# Patient Record
Sex: FEMALE | Race: WHITE | NOT HISPANIC OR LATINO | URBAN - NONMETROPOLITAN AREA
[De-identification: names, ages, dates, MRNs, and addresses within clinical notes are randomized per-mention and may not be internally consistent; named-entity substitution may affect disease eponyms.]

---

## 2020-12-07 ENCOUNTER — IMPORTED ENCOUNTER (OUTPATIENT)
Dept: URBAN - NONMETROPOLITAN AREA CLINIC 1 | Facility: CLINIC | Age: 80
End: 2020-12-07

## 2020-12-07 PROBLEM — B02.9: Noted: 2020-12-07

## 2020-12-07 PROCEDURE — 99203 OFFICE O/P NEW LOW 30 MIN: CPT

## 2020-12-07 NOTE — PATIENT DISCUSSION
Herpes ZosterDiscussed diagnosis in detail with patient. Continue Valacycylvoir as prescribed by Urgent Care. Continue to monitor.

## 2020-12-11 ENCOUNTER — IMPORTED ENCOUNTER (OUTPATIENT)
Dept: URBAN - NONMETROPOLITAN AREA CLINIC 1 | Facility: CLINIC | Age: 80
End: 2020-12-11

## 2020-12-11 PROBLEM — H49.01: Noted: 2020-12-11

## 2020-12-11 PROCEDURE — 92012 INTRM OPH EXAM EST PATIENT: CPT

## 2020-12-11 NOTE — PATIENT DISCUSSION
3rd nerve palsy ODDiscussed findings in detail with patient. Signs/symptoms discussed with patient. (-) APD OD. Continue to monitor. RTC in 1 month for follow up

## 2021-01-15 ENCOUNTER — PREPPED CHART (OUTPATIENT)
Dept: URBAN - NONMETROPOLITAN AREA CLINIC 1 | Facility: CLINIC | Age: 81
End: 2021-01-15

## 2021-01-15 ENCOUNTER — IMPORTED ENCOUNTER (OUTPATIENT)
Dept: URBAN - NONMETROPOLITAN AREA CLINIC 1 | Facility: CLINIC | Age: 81
End: 2021-01-15

## 2021-01-15 PROCEDURE — 99213 OFFICE O/P EST LOW 20 MIN: CPT

## 2021-01-15 NOTE — PATIENT DISCUSSION
3rd nerve palsy OD - improvingDiscussed findings in detail with patient. Signs/symptoms discussed with patient. (-) APD OD. Continue to monitor. RTC 1 year or PRN

## 2021-01-15 NOTE — PATIENT DISCUSSION
improving, Discussed findings in detail with patient. Signs/Symptoms discussed. APD OD continue to monitor.

## 2022-02-27 ASSESSMENT — VISUAL ACUITY: OS_CC: 20/50-1

## 2022-02-27 ASSESSMENT — TONOMETRY
OS_IOP_MMHG: 15
OD_IOP_MMHG: 11

## 2022-02-28 ENCOUNTER — ESTABLISHED PATIENT (OUTPATIENT)
Dept: URBAN - NONMETROPOLITAN AREA CLINIC 1 | Facility: CLINIC | Age: 82
End: 2022-02-28

## 2022-02-28 DIAGNOSIS — Z96.1: ICD-10-CM

## 2022-02-28 DIAGNOSIS — H52.4: ICD-10-CM

## 2022-02-28 DIAGNOSIS — H35.3131: ICD-10-CM

## 2022-02-28 PROCEDURE — 92015 DETERMINE REFRACTIVE STATE: CPT

## 2022-02-28 PROCEDURE — 99214 OFFICE O/P EST MOD 30 MIN: CPT

## 2022-02-28 ASSESSMENT — VISUAL ACUITY
OD_PH: 20/60
OD_CC: 20/80-1
OS_PH: 20/60-1
OS_CC: 20/70-1

## 2022-02-28 ASSESSMENT — TONOMETRY
OD_IOP_MMHG: 17
OS_IOP_MMHG: 17

## 2022-02-28 NOTE — PATIENT DISCUSSION
Importance of daily AREDS II study multivitamin and [employer] Grid checks discussed with patient. Patient to follow-up immediately with any new onset of decreased vision and/or metamorphopsia.
Now resolved.
Patient given Rx for glasses.
Stable. Observe.
no diarrhea/no melena

## 2022-04-09 ASSESSMENT — TONOMETRY
OS_IOP_MMHG: 15
OD_IOP_MMHG: 20
OS_IOP_MMHG: 14
OD_IOP_MMHG: 11

## 2022-04-09 ASSESSMENT — VISUAL ACUITY
OS_SC: 20/40-2
OS_SC: 20/50-1

## 2022-04-09 ASSESSMENT — PACHYMETRY
OD_CT_UM: 556; ADJ: WNL
OS_CT_UM: 554; ADJ: THIN